# Patient Record
Sex: FEMALE | Race: WHITE | NOT HISPANIC OR LATINO | URBAN - METROPOLITAN AREA
[De-identification: names, ages, dates, MRNs, and addresses within clinical notes are randomized per-mention and may not be internally consistent; named-entity substitution may affect disease eponyms.]

---

## 2019-07-24 ENCOUNTER — OFFICE (AMBULATORY)
Dept: URBAN - METROPOLITAN AREA CLINIC 64 | Facility: CLINIC | Age: 59
End: 2019-07-24

## 2019-07-24 VITALS
SYSTOLIC BLOOD PRESSURE: 163 MMHG | HEART RATE: 52 BPM | WEIGHT: 142 LBS | HEIGHT: 65 IN | DIASTOLIC BLOOD PRESSURE: 91 MMHG

## 2019-07-24 DIAGNOSIS — R19.7 DIARRHEA, UNSPECIFIED: ICD-10-CM

## 2019-07-24 DIAGNOSIS — K30 FUNCTIONAL DYSPEPSIA: ICD-10-CM

## 2019-07-24 DIAGNOSIS — R93.3 ABNORMAL FINDINGS ON DIAGNOSTIC IMAGING OF OTHER PARTS OF DI: ICD-10-CM

## 2019-07-24 DIAGNOSIS — R13.10 DYSPHAGIA, UNSPECIFIED: ICD-10-CM

## 2019-07-24 PROCEDURE — 99243 OFF/OP CNSLTJ NEW/EST LOW 30: CPT | Performed by: NURSE PRACTITIONER

## 2019-07-24 RX ORDER — OMEPRAZOLE 40 MG/1
40 CAPSULE, DELAYED RELEASE ORAL
Qty: 30 | Refills: 11 | Status: COMPLETED
Start: 2019-07-24 | End: 2020-06-30

## 2019-08-23 ENCOUNTER — OFFICE (AMBULATORY)
Dept: URBAN - METROPOLITAN AREA PATHOLOGY 4 | Facility: PATHOLOGY | Age: 59
End: 2019-08-23
Payer: COMMERCIAL

## 2019-08-23 ENCOUNTER — ON CAMPUS - OUTPATIENT (AMBULATORY)
Dept: URBAN - METROPOLITAN AREA HOSPITAL 2 | Facility: HOSPITAL | Age: 59
End: 2019-08-23
Payer: COMMERCIAL

## 2019-08-23 VITALS
HEART RATE: 86 BPM | DIASTOLIC BLOOD PRESSURE: 119 MMHG | SYSTOLIC BLOOD PRESSURE: 154 MMHG | SYSTOLIC BLOOD PRESSURE: 176 MMHG | SYSTOLIC BLOOD PRESSURE: 163 MMHG | SYSTOLIC BLOOD PRESSURE: 165 MMHG | HEART RATE: 90 BPM | TEMPERATURE: 96.6 F | DIASTOLIC BLOOD PRESSURE: 85 MMHG | DIASTOLIC BLOOD PRESSURE: 106 MMHG | OXYGEN SATURATION: 98 % | OXYGEN SATURATION: 99 % | SYSTOLIC BLOOD PRESSURE: 133 MMHG | DIASTOLIC BLOOD PRESSURE: 95 MMHG | HEART RATE: 88 BPM | HEART RATE: 92 BPM | HEART RATE: 72 BPM | DIASTOLIC BLOOD PRESSURE: 91 MMHG | WEIGHT: 133 LBS | RESPIRATION RATE: 16 BRPM | SYSTOLIC BLOOD PRESSURE: 113 MMHG | HEART RATE: 81 BPM | DIASTOLIC BLOOD PRESSURE: 64 MMHG | DIASTOLIC BLOOD PRESSURE: 87 MMHG | SYSTOLIC BLOOD PRESSURE: 140 MMHG | DIASTOLIC BLOOD PRESSURE: 82 MMHG | OXYGEN SATURATION: 97 % | RESPIRATION RATE: 18 BRPM | HEIGHT: 65 IN | SYSTOLIC BLOOD PRESSURE: 138 MMHG

## 2019-08-23 DIAGNOSIS — R19.7 DIARRHEA, UNSPECIFIED: ICD-10-CM

## 2019-08-23 DIAGNOSIS — K62.1 RECTAL POLYP: ICD-10-CM

## 2019-08-23 DIAGNOSIS — K64.1 SECOND DEGREE HEMORRHOIDS: ICD-10-CM

## 2019-08-23 DIAGNOSIS — K30 FUNCTIONAL DYSPEPSIA: ICD-10-CM

## 2019-08-23 DIAGNOSIS — R13.10 DYSPHAGIA, UNSPECIFIED: ICD-10-CM

## 2019-08-23 DIAGNOSIS — K57.30 DIVERTICULOSIS OF LARGE INTESTINE WITHOUT PERFORATION OR ABS: ICD-10-CM

## 2019-08-23 DIAGNOSIS — R93.3 ABNORMAL FINDINGS ON DIAGNOSTIC IMAGING OF OTHER PARTS OF DI: ICD-10-CM

## 2019-08-23 DIAGNOSIS — K52.82 EOSINOPHILIC COLITIS: ICD-10-CM

## 2019-08-23 LAB
GI HISTOLOGY: A. SELECT: (no result)
GI HISTOLOGY: B. SELECT: (no result)
GI HISTOLOGY: C. UNSPECIFIED: (no result)
GI HISTOLOGY: D. UNSPECIFIED: (no result)
GI HISTOLOGY: PDF REPORT: (no result)

## 2019-08-23 PROCEDURE — 45385 COLONOSCOPY W/LESION REMOVAL: CPT | Performed by: INTERNAL MEDICINE

## 2019-08-23 PROCEDURE — 45380 COLONOSCOPY AND BIOPSY: CPT | Mod: 59 | Performed by: INTERNAL MEDICINE

## 2019-08-23 PROCEDURE — 43239 EGD BIOPSY SINGLE/MULTIPLE: CPT | Performed by: INTERNAL MEDICINE

## 2019-08-23 PROCEDURE — 43450 DILATE ESOPHAGUS 1/MULT PASS: CPT | Performed by: INTERNAL MEDICINE

## 2019-08-23 PROCEDURE — 88305 TISSUE EXAM BY PATHOLOGIST: CPT | Performed by: INTERNAL MEDICINE

## 2020-06-30 ENCOUNTER — OFFICE (AMBULATORY)
Dept: URBAN - METROPOLITAN AREA CLINIC 64 | Facility: CLINIC | Age: 60
End: 2020-06-30

## 2020-06-30 VITALS
SYSTOLIC BLOOD PRESSURE: 152 MMHG | DIASTOLIC BLOOD PRESSURE: 98 MMHG | HEIGHT: 65 IN | HEART RATE: 58 BPM | WEIGHT: 125 LBS

## 2020-06-30 DIAGNOSIS — K30 FUNCTIONAL DYSPEPSIA: ICD-10-CM

## 2020-06-30 DIAGNOSIS — I10 ESSENTIAL (PRIMARY) HYPERTENSION: ICD-10-CM

## 2020-06-30 DIAGNOSIS — R19.7 DIARRHEA, UNSPECIFIED: ICD-10-CM

## 2020-06-30 DIAGNOSIS — Z90.49 ACQUIRED ABSENCE OF OTHER SPECIFIED PARTS OF DIGESTIVE TRACT: ICD-10-CM

## 2020-06-30 DIAGNOSIS — F41.9 ANXIETY DISORDER, UNSPECIFIED: ICD-10-CM

## 2020-06-30 PROCEDURE — 99214 OFFICE O/P EST MOD 30 MIN: CPT | Performed by: INTERNAL MEDICINE

## 2020-06-30 RX ORDER — AMOXICILLIN AND CLAVULANATE POTASSIUM 875; 125 MG/1; 1/1
1750 TABLET, FILM COATED ORAL
Qty: 14 | Refills: 0 | Status: ACTIVE
Start: 2020-06-30

## 2023-06-06 ENCOUNTER — OFFICE VISIT (OUTPATIENT)
Dept: ORTHOPEDIC SURGERY | Facility: CLINIC | Age: 63
End: 2023-06-06
Payer: MEDICAID

## 2023-06-06 VITALS — OXYGEN SATURATION: 98 % | HEIGHT: 65 IN | BODY MASS INDEX: 24.66 KG/M2 | WEIGHT: 148 LBS | HEART RATE: 83 BPM

## 2023-06-06 DIAGNOSIS — M25.521 RIGHT ELBOW PAIN: ICD-10-CM

## 2023-06-06 DIAGNOSIS — G56.21 CUBITAL TUNNEL SYNDROME ON RIGHT: ICD-10-CM

## 2023-06-06 DIAGNOSIS — M25.621 DECREASED ROM OF RIGHT ELBOW: ICD-10-CM

## 2023-06-06 DIAGNOSIS — M19.021 PRIMARY OSTEOARTHRITIS OF RIGHT ELBOW: Primary | ICD-10-CM

## 2023-06-06 RX ORDER — AMLODIPINE BESYLATE 5 MG/1
1 TABLET ORAL DAILY
COMMUNITY
Start: 2023-05-16

## 2023-06-06 RX ORDER — ESCITALOPRAM OXALATE 10 MG/1
1 TABLET ORAL DAILY
COMMUNITY

## 2023-06-06 RX ORDER — ATORVASTATIN CALCIUM 20 MG/1
20 TABLET, FILM COATED ORAL
COMMUNITY
Start: 2023-04-03

## 2023-06-06 RX ORDER — BUPROPION HYDROCHLORIDE 100 MG/1
100 TABLET ORAL
COMMUNITY

## 2023-06-06 RX ORDER — PANTOPRAZOLE SODIUM 40 MG/1
TABLET, DELAYED RELEASE ORAL
COMMUNITY

## 2023-06-06 RX ORDER — FLUTICASONE PROPIONATE 50 MCG
SPRAY, SUSPENSION (ML) NASAL
COMMUNITY

## 2023-06-06 RX ORDER — HYDROCHLOROTHIAZIDE 25 MG/1
1 TABLET ORAL DAILY
COMMUNITY
Start: 2023-04-01

## 2023-06-06 RX ORDER — LISINOPRIL 40 MG/1
1 TABLET ORAL DAILY
COMMUNITY
Start: 2023-04-29

## 2023-06-06 RX ADMIN — METHYLPREDNISOLONE ACETATE 40 MG: 40 INJECTION, SUSPENSION INTRA-ARTICULAR; INTRALESIONAL; INTRAMUSCULAR; SOFT TISSUE at 17:17

## 2023-06-06 RX ADMIN — LIDOCAINE HYDROCHLORIDE 1 ML: 10 INJECTION, SOLUTION INFILTRATION; PERINEURAL at 17:17

## 2023-06-06 NOTE — PROGRESS NOTES
Procedure   Medium Joint Arthrocentesis: R elbow  Date/Time: 6/6/2023 5:17 PM  Consent given by: patient  Site marked: site marked  Timeout: Immediately prior to procedure a time out was called to verify the correct patient, procedure, equipment, support staff and site/side marked as required   Supporting Documentation  Indications: pain   Procedure Details  Location: elbow - R elbow  Preparation: Patient was prepped and draped in the usual sterile fashion  Needle size: 25 G  Approach: posterolateral  Medications administered: 40 mg methylPREDNISolone acetate 40 MG/ML; 1 mL lidocaine 1 %  Patient tolerance: patient tolerated the procedure well with no immediate complications

## 2023-06-06 NOTE — PROGRESS NOTES
"NEW VISIT    Patient: Nilsa Barraza  ?  YOB: 1960    MRN: [id number]  ?  Chief Complaint   Patient presents with    Right Elbow - Initial Evaluation      ?  HPI: Patient is 62-year-old female who presents today for chronic right elbow pain.  This has been an ongoing issue for over 5 years worsening over the last few months.  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  Pain is diffusely across the elbow with associated stiffness, difficulty in fully straightening or bending the arm, and occasional painful popping.  She also has occasional tingling into the fifth digit has been worsening over the last month.  There is associated decreased  strength per patient.  Has been trying ibuprofen and Aleve without relief of her symptoms.  No prior bracing, physical therapy or injections for the elbow.    Allergies: No Known Allergies    Past Medical History:   Diagnosis Date    Acid reflux     Depression     Hyperlipidemia     Hypertension      Past Surgical History:   Procedure Laterality Date    FINGER FUSION      right  thumb     Social History     Occupational History    Not on file   Tobacco Use    Smoking status: Every Day     Packs/day: 0.50     Types: Cigarettes    Smokeless tobacco: Never   Substance and Sexual Activity    Alcohol use: Not Currently     Alcohol/week: 4.0 - 5.0 standard drinks     Types: 4 - 5 Cans of beer per week    Drug use: Yes     Types: Marijuana    Sexual activity: Defer      Social History     Social History Narrative    Not on file     History reviewed. No pertinent family history.    Review of Systems  Constitutional: Negative.  Negative for fever.   Musculoskeletal: Positive for joint pain  Skin: Negative.  Negative for rash and wound.    Neurological: Negative for numbness.     Vitals:    06/06/23 1502   Pulse: 83   SpO2: 98%   Weight: 67.1 kg (148 lb)   Height: 165.1 cm (65\")        Physical Exam  Constitutional: Patient is oriented " to person, place, and time. Appears well-developed and well-nourished.   Head: Normocephalic and atraumatic.   Pulmonary/Chest: Effort normal.   Musculoskeletal:   See detailed exam below   Neurological: Alert and oriented to person, place, and time. No sensory deficit. Coordination normal.   Skin: Skin is warm and dry. Capillary refill takes less than 2 seconds. No rash noted. No erythema.     The right elbow is without obvious signs of acute bony deformity, swelling, erythema or ecchymosis.  There is approximately 1 cm cystic structure over lying the lateral joint line within the soft tissues that is freely mobile and compressible without tenderness.  There is tenderness over the medial and lateral joint line. Posterior elbow is tender. Soft tissues including the biceps tendon, flexor/pronator mass, common extensor tendon, and UCL are nontender. Active and passive range of motion at the elbow are noted in terminal flexion and extension with pain at end range.  There is no instability with stress testing. Elbow flexion and extension strength are 4+/5. Supination and pronation are are normal and uncomfortable. Wrist range of motion is normal and painless. Wrist/forearm strength is 5/5 and painless.   Resisted EDC and wrist extension are negative.  Positive Tinel's at cubital tunnel.  Decreased sensation to light touch over fifth digit.  The opposite arm is normal. Gait is pain-free and tandem.      Diagnostics:  xrays obtained today    XR Elbow 2 View Right    Result Date: 6/7/2023  Impression: Severe arthritis of the right elbow likely posttraumatic. Small joint effusion. 1 cm intra-articular ossified loose body seen posteriorly. Electronically Signed: Chikis Landaverde  6/7/2023 8:20 PM EDT  Workstation ID: [id number]        Assessment:  Diagnoses and all orders for this visit:    1. Primary osteoarthritis of right elbow (Primary)  -     Medium Joint Arthrocentesis: R elbow    2. Right elbow pain  -     XR Elbow 2 View  Right  -     EMG & Nerve Conduction Test; Future  -     Medium Joint Arthrocentesis: R elbow    3. Cubital tunnel syndrome on right  -     EMG & Nerve Conduction Test; Future    4. Decreased ROM of right elbow      ?  Plan    Intra-articular elbow injection discussed and given as noted above without complication.  Discussed x-ray findings in depth including significant osteophyte formation both anterior and posteriorly of the elbow joint.  Given mechanical impingement I did discuss potential surgical referral to discuss potential removal of osteophytes.  Given reproducible cubital tunnel symptoms with numbness and weakness of the fifth digit, will order EMG.  Physical Therapy/OT discussed for maximizing range of motion and improving stiffness.  Activity modifications discussed and recommended  Use of  cubital tunnel bracing at night  discussed and recommended, fitted in the office today  Rest, ice, compression, and elevation (RICE) therapy  Eli topical over-the-counter Voltaren cream to the affected area 3-4 times daily as needed.  Follow up in 3 month(s) or sooner as needed     Above diagnosis and plan discussed with the patient in office today.  Did obtain x-rays in the office today which were remarkable for severe osteoarthritic change of the right elbow joint with significant both anterior and posterior osteophyte formation which is likely contributing to the patient's loss of motion in terminal extension and flexion as well as her pain.  The posterior osteophyte could also be compressing the cubital tunnel which could be contributing to her cubital tunnel syndrome as noted on exam today.  Discussed options as noted above and the patient elected to proceed with a intra-articular corticosteroid injection for pain relief in office today, as well as was fitted for cubital tunnel bracing at night.  We will also order an EMG to further investigate her cubital tunnel syndrome and severity of ulnar nerve symptoms.  We  will follow-up in 3 months to monitor her progress or sooner as needed.    Date of encounter: 06/06/2023   Usama Gayle DO    Electronically signed by Usama Gayle DO, 06/06/23, 3:16 PM EDT.    Disclaimer: Please note that areas of this note were completed with computer voice recognition software.  Quite often unanticipated grammatical, syntax, homophones, and other interpretive errors are inadvertently transcribed by the computer software. Please excuse any errors that have escaped final proofreading.

## 2023-06-08 RX ORDER — LIDOCAINE HYDROCHLORIDE 10 MG/ML
1 INJECTION, SOLUTION INFILTRATION; PERINEURAL
Status: COMPLETED | OUTPATIENT
Start: 2023-06-06 | End: 2023-06-06

## 2023-06-08 RX ORDER — METHYLPREDNISOLONE ACETATE 40 MG/ML
40 INJECTION, SUSPENSION INTRA-ARTICULAR; INTRALESIONAL; INTRAMUSCULAR; SOFT TISSUE
Status: COMPLETED | OUTPATIENT
Start: 2023-06-06 | End: 2023-06-06

## 2023-11-30 NOTE — PROGRESS NOTES
EMG and Nerve Conduction Studies    Patient Name: Nilsa Barraza    Date of Study:12/1/23    Referring Provider:ADRI CHEN DO    History:    RUE-PAIN AND CUBITAL TUNNEL    Results:    The complete report includes the data sheets.     Prior to starting the procedure, the patient's identity was verified, pertinent available records were reviewed, the nature of the procedure was explained, the appropriate site of the exam were confirmed directly with the patient, and a pre-procedure pause was performed for final verification of all the above.    1.  Right median sensory motor nerve conduction studies were normal.    2.The right ulnar sensory study was attempted with no response recorded.  The right ulnar motor distal latency was normal.  The amplitude of the compound muscle action potential was approximately 50% of normal.  The conduction velocity in the forearm was just slightly reduced and significantly reduced across elbow segment.    3.  EMG of the first dorsal interosseous muscle showed fibrillations positive sharp waves and a discrete motor unit firing pattern.  The other muscles examined in the C5-T1 myotomes were normal.      Impression:    This is an abnormal study.  There is evidence of severe right ulnar neuropathy at the elbow.      Electronically signed by :    Joseph Seipel, M .D.  December 1, 2023

## 2023-12-01 ENCOUNTER — PROCEDURE VISIT (OUTPATIENT)
Dept: NEUROLOGY | Facility: CLINIC | Age: 63
End: 2023-12-01
Payer: MEDICAID

## 2023-12-01 VITALS
HEIGHT: 65 IN | DIASTOLIC BLOOD PRESSURE: 86 MMHG | BODY MASS INDEX: 24.66 KG/M2 | SYSTOLIC BLOOD PRESSURE: 122 MMHG | HEART RATE: 98 BPM | WEIGHT: 148 LBS

## 2023-12-01 DIAGNOSIS — G56.21 CUBITAL TUNNEL SYNDROME ON RIGHT: ICD-10-CM

## 2023-12-01 DIAGNOSIS — M25.521 RIGHT ELBOW PAIN: ICD-10-CM

## 2023-12-05 ENCOUNTER — OFFICE VISIT (OUTPATIENT)
Dept: ORTHOPEDIC SURGERY | Facility: CLINIC | Age: 63
End: 2023-12-05
Payer: MEDICAID

## 2023-12-05 VITALS — WEIGHT: 148 LBS | BODY MASS INDEX: 24.66 KG/M2 | OXYGEN SATURATION: 94 % | HEIGHT: 65 IN | HEART RATE: 80 BPM

## 2023-12-05 DIAGNOSIS — M19.021 PRIMARY OSTEOARTHRITIS OF RIGHT ELBOW: ICD-10-CM

## 2023-12-05 DIAGNOSIS — M25.521 RIGHT ELBOW PAIN: ICD-10-CM

## 2023-12-05 DIAGNOSIS — G56.21 CUBITAL TUNNEL SYNDROME, RIGHT: Primary | ICD-10-CM

## 2023-12-05 RX ORDER — ATORVASTATIN CALCIUM 40 MG/1
40 TABLET, FILM COATED ORAL DAILY
COMMUNITY
Start: 2023-11-30

## 2023-12-05 NOTE — PROGRESS NOTES
"FOLLOW UP VISIT    Patient: Nilsa Barraza  ?  YOB: 1960    MRN: [id number]  ?  Chief Complaint   Patient presents with    Right Elbow - Follow-up      ?  HPI: Patient is 62-year-old female who presents today for chronic right elbow pain.  This has been an ongoing issue for over 5 years worsening over the last few months.  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  Pain is diffusely across the elbow with associated stiffness, difficulty in fully straightening or bending the arm, and occasional painful popping.  She also has occasional tingling into the fifth digit has been worsening over the last month.  There is associated decreased  strength per patient.  Has been trying ibuprofen and Aleve without relief of her symptoms.  No prior bracing, physical therapy or injections for the elbow.    Allergies: No Known Allergies    Past Medical History:   Diagnosis Date    Acid reflux     Depression     Hyperlipidemia     Hypertension      Past Surgical History:   Procedure Laterality Date    FINGER FUSION      right  thumb     Social History     Occupational History    Not on file   Tobacco Use    Smoking status: Every Day     Packs/day: .5     Types: Cigarettes    Smokeless tobacco: Never   Substance and Sexual Activity    Alcohol use: Not Currently     Alcohol/week: 4.0 - 5.0 standard drinks of alcohol     Types: 4 - 5 Cans of beer per week    Drug use: Yes     Types: Marijuana    Sexual activity: Defer      Social History     Social History Narrative    Not on file     History reviewed. No pertinent family history.    Review of Systems  Constitutional: Negative.  Negative for fever.   Musculoskeletal: Positive for joint pain  Skin: Negative.  Negative for rash and wound.    Neurological: Negative for numbness.     Vitals:    12/05/23 0946   Pulse: 80   SpO2: 94%   Weight: 67.1 kg (148 lb)   Height: 165.1 cm (65\")        Physical Exam  Constitutional: Patient is " oriented to person, place, and time. Appears well-developed and well-nourished.   Head: Normocephalic and atraumatic.   Pulmonary/Chest: Effort normal.   Musculoskeletal:   See detailed exam below   Neurological: Alert and oriented to person, place, and time. No sensory deficit. Coordination normal.   Skin: Skin is warm and dry. Capillary refill takes less than 2 seconds. No rash noted. No erythema.     The right elbow is without obvious signs of acute bony deformity, swelling, erythema or ecchymosis.  There is approximately 1 cm cystic structure over lying the lateral joint line within the soft tissues that is freely mobile and compressible without tenderness.  There is tenderness over the medial and lateral joint line. Posterior elbow is tender. Soft tissues including the biceps tendon, flexor/pronator mass, common extensor tendon, and UCL are nontender. Active and passive range of motion at the elbow are noted in terminal flexion and extension with pain at end range.  There is no instability with stress testing. Elbow flexion and extension strength are 4+/5. Supination and pronation are are normal and uncomfortable. Wrist range of motion is normal and painless. Wrist/forearm strength is 5/5 and painless.   Resisted EDC and wrist extension are negative.  Positive Tinel's at cubital tunnel.  Decreased sensation to light touch over fifth digit.  The opposite arm is normal. Gait is pain-free and tandem.      Diagnostics:         EMG and Nerve Conduction Studies     Patient Name: Nilsa Barraza     Date of Study:12/1/23     Referring Provider:ADRI CHEN DO     History:     RUE-PAIN AND CUBITAL TUNNEL     Results:     The complete report includes the data sheets.      Prior to starting the procedure, the patient's identity was verified, pertinent available records were reviewed, the nature of the procedure was explained, the appropriate site of the exam were confirmed directly with the patient, and a pre-procedure pause  was performed for final verification of all the above.     1.  Right median sensory motor nerve conduction studies were normal.     2.The right ulnar sensory study was attempted with no response recorded.  The right ulnar motor distal latency was normal.  The amplitude of the compound muscle action potential was approximately 50% of normal.  The conduction velocity in the forearm was just slightly reduced and significantly reduced across elbow segment.     3.  EMG of the first dorsal interosseous muscle showed fibrillations positive sharp waves and a discrete motor unit firing pattern.  The other muscles examined in the C5-T1 myotomes were normal.        Impression:     This is an abnormal study.  There is evidence of severe right ulnar neuropathy at the elbow.        Electronically signed by :     Joseph Seipel, M .D.  December 1, 2023                XR Elbow 2 View Right    Result Date: 6/7/2023  Impression: Severe arthritis of the right elbow likely posttraumatic. Small joint effusion. 1 cm intra-articular ossified loose body seen posteriorly. Electronically Signed: Chikis Landaverde  6/7/2023 8:20 PM EDT  Workstation ID: [id number]        Assessment:  Diagnoses and all orders for this visit:    1. Cubital tunnel syndrome, right (Primary)    2. Primary osteoarthritis of right elbow    3. Right elbow pain        ?  Plan    Patient with continued right elbow/arm symptoms most significant for numbness and tingling and developing weakness particularly with  strength.  He did have mild improvement in elbow discomfort from most recent intra-articular corticosteroid injection which she states lasted 3 weeks.  EMG findings discussed in detail with the patient in office today remarkable for severe ulnar neuropathy at the elbow likely with significant contribution from elbow osteoarthritis and posterior medial osteophyte formation.  There is already near complete sensory involvement, and developing motor component, both on EMG  findings as well as subjectively via patient.  Discussed given the findings and continuing interval worsening of the patient's weakness regarding the right upper extremity in the ulnar nerve distribution I would recommend surgical consult for potential osteophyte removal as well as cubital tunnel release.  The patient is agreeable and referral will be placed to [facility] per patient request.  Will hold on any further injection therapy given limited relief and potential surgical options as noted above  Hold on any PT OT pending surgical referral  Activity modifications discussed and recommended  Worsening stiffness in symptoms with cubital tunnel bracing.  Will discontinue at this time  Rest, ice, compression, and elevation (RICE) therapy  Continue over-the-counter topical Voltaren team as needed  Follow up with [facility] as discussed above.      Date of encounter: 12/5/2023  Usama Gayle DO      Disclaimer: Please note that areas of this note were completed with computer voice recognition software.  Quite often unanticipated grammatical, syntax, homophones, and other interpretive errors are inadvertently transcribed by the computer software. Please excuse any errors that have escaped final proofreading.